# Patient Record
Sex: FEMALE | URBAN - METROPOLITAN AREA
[De-identification: names, ages, dates, MRNs, and addresses within clinical notes are randomized per-mention and may not be internally consistent; named-entity substitution may affect disease eponyms.]

---

## 2021-03-03 ENCOUNTER — NEW PATIENT (OUTPATIENT)
Dept: URBAN - METROPOLITAN AREA CLINIC 60 | Facility: CLINIC | Age: 71
End: 2021-03-03
Payer: COMMERCIAL

## 2021-03-03 DIAGNOSIS — H43.393 OTHER VITREOUS OPACITIES, BILATERAL: ICD-10-CM

## 2021-03-03 DIAGNOSIS — Z96.1 PRESENCE OF INTRAOCULAR LENS: ICD-10-CM

## 2021-03-03 DIAGNOSIS — Z79.84 LONG TERM (CURRENT) USE OF ORAL ANTIDIABETIC DRUGS: ICD-10-CM

## 2021-03-03 DIAGNOSIS — E11.9 TYPE 2 DIABETES MELLITUS WITHOUT COMPLICATIONS: Primary | ICD-10-CM

## 2021-03-03 PROCEDURE — 92004 COMPRE OPH EXAM NEW PT 1/>: CPT | Performed by: OPTOMETRIST

## 2021-03-03 RX ORDER — PREDNISOLONE ACETATE 10 MG/ML
1 % SUSPENSION/ DROPS OPHTHALMIC
Qty: 5 | Refills: 0 | Status: ACTIVE
Start: 2021-03-03

## 2021-03-03 RX ORDER — PROPYLENE GLYCOL 0.06 MG/ML
0.6 % SOLUTION/ DROPS OPHTHALMIC
Qty: 0 | Refills: 0 | Status: ACTIVE
Start: 2021-03-03

## 2021-03-03 ASSESSMENT — INTRAOCULAR PRESSURE
OD: 20
OS: 20

## 2021-03-03 ASSESSMENT — VISUAL ACUITY
OD: 20/25
OS: 20/30

## 2021-03-24 ENCOUNTER — FOLLOW UP ESTABLISHED (OUTPATIENT)
Dept: URBAN - METROPOLITAN AREA CLINIC 60 | Facility: CLINIC | Age: 71
End: 2021-03-24
Payer: COMMERCIAL

## 2021-03-24 DIAGNOSIS — H16.223 KERATOCONJUNCTIVITIS SICCA, BILATERAL: Primary | ICD-10-CM

## 2021-03-24 DIAGNOSIS — H18.593 OTHER HEREDITARY CORNEAL DYSTROPHIES: ICD-10-CM

## 2021-03-24 PROCEDURE — 92025 CPTRIZED CORNEAL TOPOGRAPHY: CPT | Performed by: OPTOMETRIST

## 2021-03-24 PROCEDURE — 92012 INTRM OPH EXAM EST PATIENT: CPT | Performed by: OPTOMETRIST

## 2021-03-24 RX ORDER — DOXYCYCLINE HYCLATE 100 MG/1
100 MG TABLET, COATED ORAL
Qty: 30 | Refills: 0 | Status: ACTIVE
Start: 2021-03-24

## 2021-03-24 ASSESSMENT — INTRAOCULAR PRESSURE
OS: 20
OD: 20

## 2022-07-07 ENCOUNTER — OFFICE VISIT (OUTPATIENT)
Dept: URBAN - METROPOLITAN AREA CLINIC 60 | Facility: CLINIC | Age: 72
End: 2022-07-07
Payer: COMMERCIAL

## 2022-07-07 DIAGNOSIS — H43.393 OTHER VITREOUS OPACITIES, BILATERAL: ICD-10-CM

## 2022-07-07 DIAGNOSIS — E11.9 TYPE 2 DIABETES MELLITUS W/O COMPLICATION: ICD-10-CM

## 2022-07-07 DIAGNOSIS — Z96.1 PRESENCE OF INTRAOCULAR LENS: ICD-10-CM

## 2022-07-07 DIAGNOSIS — H16.223 KERATOCONJUNCTIVITIS SICCA, BILATERAL: Primary | ICD-10-CM

## 2022-07-07 DIAGNOSIS — H18.593 OTHER HEREDITARY CORNEAL DYSTROPHIES: ICD-10-CM

## 2022-07-07 PROCEDURE — 92014 COMPRE OPH EXAM EST PT 1/>: CPT | Performed by: OPTOMETRIST

## 2022-07-07 PROCEDURE — 92025 CPTRIZED CORNEAL TOPOGRAPHY: CPT | Performed by: OPTOMETRIST

## 2022-07-07 RX ORDER — MINERAL OIL AND WHITE PETROLATUM 30; 940 MG/G; MG/G
OINTMENT OPHTHALMIC
Qty: 3.5 | Refills: 11 | Status: ACTIVE
Start: 2022-07-07

## 2022-07-07 RX ORDER — PROPYLENE GLYCOL 0.06 MG/ML
0.6 % SOLUTION/ DROPS OPHTHALMIC
Qty: 15 | Refills: 11 | Status: ACTIVE
Start: 2022-07-07

## 2022-07-07 ASSESSMENT — INTRAOCULAR PRESSURE
OS: 16
OD: 18

## 2022-07-07 NOTE — IMPRESSION/PLAN
Impression: Keratoconjunctivitis sicca, bilateral: K47.396. Plan: Patient has a history of dry eye. Diagnosis discussed with patient. Recommend patient use artificial tears 4 times a day and a gel or ointment QHS OU. Erx'd tears and gel to patient's pharmacy. If there is no improvement with tears and gel, patient to call office and will start her on a prescription dry eye drop.

## 2022-07-07 NOTE — IMPRESSION/PLAN
Impression: Other hereditary corneal dystrophies: H18.593. Plan: Patient educated on findings. Reji done today to monitor for progression. No need for surgical intervention at this time.

## 2022-07-07 NOTE — IMPRESSION/PLAN
Impression: Type 2 diabetes mellitus w/o complication: R51.4. Plan: No evidence of diabetic retinopathy or diabetic macular edema. Discussed ocular and systemic benefits of blood sugar control. Stressed importance of yearly diabetic eye exams.

## 2022-07-07 NOTE — IMPRESSION/PLAN
Impression: Other vitreous opacities, bilateral: H43.393. Plan: Patient educated regarding findings.

## 2023-12-21 ENCOUNTER — EMERGENCY (EMERGENCY)
Facility: HOSPITAL | Age: 73
LOS: 0 days | Discharge: HOME | End: 2023-12-22
Attending: EMERGENCY MEDICINE
Payer: MEDICARE

## 2023-12-21 VITALS
HEART RATE: 99 BPM | WEIGHT: 186.95 LBS | OXYGEN SATURATION: 98 % | HEIGHT: 64 IN | DIASTOLIC BLOOD PRESSURE: 72 MMHG | TEMPERATURE: 99 F | RESPIRATION RATE: 18 BRPM | SYSTOLIC BLOOD PRESSURE: 115 MMHG

## 2023-12-21 DIAGNOSIS — F43.20 ADJUSTMENT DISORDER, UNSPECIFIED: ICD-10-CM

## 2023-12-21 LAB
ALBUMIN SERPL ELPH-MCNC: 3.5 G/DL — SIGNIFICANT CHANGE UP (ref 3.3–5)
ALBUMIN SERPL ELPH-MCNC: 3.5 G/DL — SIGNIFICANT CHANGE UP (ref 3.3–5)
ALP SERPL-CCNC: 71 U/L — SIGNIFICANT CHANGE UP (ref 40–120)
ALP SERPL-CCNC: 71 U/L — SIGNIFICANT CHANGE UP (ref 40–120)
ALT FLD-CCNC: 16 U/L — SIGNIFICANT CHANGE UP (ref 12–78)
ALT FLD-CCNC: 16 U/L — SIGNIFICANT CHANGE UP (ref 12–78)
ANION GAP SERPL CALC-SCNC: 6 MMOL/L — SIGNIFICANT CHANGE UP (ref 5–17)
ANION GAP SERPL CALC-SCNC: 6 MMOL/L — SIGNIFICANT CHANGE UP (ref 5–17)
APAP SERPL-MCNC: <2 UG/ML — LOW (ref 10–30)
APAP SERPL-MCNC: <2 UG/ML — LOW (ref 10–30)
AST SERPL-CCNC: 14 U/L — LOW (ref 15–37)
AST SERPL-CCNC: 14 U/L — LOW (ref 15–37)
BASOPHILS # BLD AUTO: 0.1 K/UL — SIGNIFICANT CHANGE UP (ref 0–0.2)
BASOPHILS # BLD AUTO: 0.1 K/UL — SIGNIFICANT CHANGE UP (ref 0–0.2)
BASOPHILS NFR BLD AUTO: 1.1 % — SIGNIFICANT CHANGE UP (ref 0–2)
BASOPHILS NFR BLD AUTO: 1.1 % — SIGNIFICANT CHANGE UP (ref 0–2)
BILIRUB SERPL-MCNC: 0.4 MG/DL — SIGNIFICANT CHANGE UP (ref 0.2–1.2)
BILIRUB SERPL-MCNC: 0.4 MG/DL — SIGNIFICANT CHANGE UP (ref 0.2–1.2)
BUN SERPL-MCNC: 17 MG/DL — SIGNIFICANT CHANGE UP (ref 7–23)
BUN SERPL-MCNC: 17 MG/DL — SIGNIFICANT CHANGE UP (ref 7–23)
CALCIUM SERPL-MCNC: 9.3 MG/DL — SIGNIFICANT CHANGE UP (ref 8.5–10.1)
CALCIUM SERPL-MCNC: 9.3 MG/DL — SIGNIFICANT CHANGE UP (ref 8.5–10.1)
CHLORIDE SERPL-SCNC: 109 MMOL/L — HIGH (ref 96–108)
CHLORIDE SERPL-SCNC: 109 MMOL/L — HIGH (ref 96–108)
CO2 SERPL-SCNC: 24 MMOL/L — SIGNIFICANT CHANGE UP (ref 22–31)
CO2 SERPL-SCNC: 24 MMOL/L — SIGNIFICANT CHANGE UP (ref 22–31)
CREAT SERPL-MCNC: 0.83 MG/DL — SIGNIFICANT CHANGE UP (ref 0.5–1.3)
CREAT SERPL-MCNC: 0.83 MG/DL — SIGNIFICANT CHANGE UP (ref 0.5–1.3)
EGFR: 74 ML/MIN/1.73M2 — SIGNIFICANT CHANGE UP
EGFR: 74 ML/MIN/1.73M2 — SIGNIFICANT CHANGE UP
EOSINOPHIL # BLD AUTO: 0.07 K/UL — SIGNIFICANT CHANGE UP (ref 0–0.5)
EOSINOPHIL # BLD AUTO: 0.07 K/UL — SIGNIFICANT CHANGE UP (ref 0–0.5)
EOSINOPHIL NFR BLD AUTO: 0.8 % — SIGNIFICANT CHANGE UP (ref 0–6)
EOSINOPHIL NFR BLD AUTO: 0.8 % — SIGNIFICANT CHANGE UP (ref 0–6)
ETHANOL SERPL-MCNC: <10 MG/DL — SIGNIFICANT CHANGE UP (ref 0–10)
ETHANOL SERPL-MCNC: <10 MG/DL — SIGNIFICANT CHANGE UP (ref 0–10)
FLUAV AG NPH QL: SIGNIFICANT CHANGE UP
FLUAV AG NPH QL: SIGNIFICANT CHANGE UP
FLUBV AG NPH QL: SIGNIFICANT CHANGE UP
FLUBV AG NPH QL: SIGNIFICANT CHANGE UP
GLUCOSE SERPL-MCNC: 229 MG/DL — HIGH (ref 70–99)
GLUCOSE SERPL-MCNC: 229 MG/DL — HIGH (ref 70–99)
HCG SERPL-ACNC: 4 MIU/ML — SIGNIFICANT CHANGE UP
HCG SERPL-ACNC: 4 MIU/ML — SIGNIFICANT CHANGE UP
HCT VFR BLD CALC: 43.6 % — SIGNIFICANT CHANGE UP (ref 34.5–45)
HCT VFR BLD CALC: 43.6 % — SIGNIFICANT CHANGE UP (ref 34.5–45)
HGB BLD-MCNC: 14.5 G/DL — SIGNIFICANT CHANGE UP (ref 11.5–15.5)
HGB BLD-MCNC: 14.5 G/DL — SIGNIFICANT CHANGE UP (ref 11.5–15.5)
IMM GRANULOCYTES NFR BLD AUTO: 0.4 % — SIGNIFICANT CHANGE UP (ref 0–0.9)
IMM GRANULOCYTES NFR BLD AUTO: 0.4 % — SIGNIFICANT CHANGE UP (ref 0–0.9)
LYMPHOCYTES # BLD AUTO: 1.58 K/UL — SIGNIFICANT CHANGE UP (ref 1–3.3)
LYMPHOCYTES # BLD AUTO: 1.58 K/UL — SIGNIFICANT CHANGE UP (ref 1–3.3)
LYMPHOCYTES # BLD AUTO: 17.1 % — SIGNIFICANT CHANGE UP (ref 13–44)
LYMPHOCYTES # BLD AUTO: 17.1 % — SIGNIFICANT CHANGE UP (ref 13–44)
MCHC RBC-ENTMCNC: 28 PG — SIGNIFICANT CHANGE UP (ref 27–34)
MCHC RBC-ENTMCNC: 28 PG — SIGNIFICANT CHANGE UP (ref 27–34)
MCHC RBC-ENTMCNC: 33.3 GM/DL — SIGNIFICANT CHANGE UP (ref 32–36)
MCHC RBC-ENTMCNC: 33.3 GM/DL — SIGNIFICANT CHANGE UP (ref 32–36)
MCV RBC AUTO: 84.2 FL — SIGNIFICANT CHANGE UP (ref 80–100)
MCV RBC AUTO: 84.2 FL — SIGNIFICANT CHANGE UP (ref 80–100)
MONOCYTES # BLD AUTO: 0.47 K/UL — SIGNIFICANT CHANGE UP (ref 0–0.9)
MONOCYTES # BLD AUTO: 0.47 K/UL — SIGNIFICANT CHANGE UP (ref 0–0.9)
MONOCYTES NFR BLD AUTO: 5.1 % — SIGNIFICANT CHANGE UP (ref 2–14)
MONOCYTES NFR BLD AUTO: 5.1 % — SIGNIFICANT CHANGE UP (ref 2–14)
NEUTROPHILS # BLD AUTO: 6.97 K/UL — SIGNIFICANT CHANGE UP (ref 1.8–7.4)
NEUTROPHILS # BLD AUTO: 6.97 K/UL — SIGNIFICANT CHANGE UP (ref 1.8–7.4)
NEUTROPHILS NFR BLD AUTO: 75.5 % — SIGNIFICANT CHANGE UP (ref 43–77)
NEUTROPHILS NFR BLD AUTO: 75.5 % — SIGNIFICANT CHANGE UP (ref 43–77)
PLATELET # BLD AUTO: 250 K/UL — SIGNIFICANT CHANGE UP (ref 150–400)
PLATELET # BLD AUTO: 250 K/UL — SIGNIFICANT CHANGE UP (ref 150–400)
POTASSIUM SERPL-MCNC: 3.5 MMOL/L — SIGNIFICANT CHANGE UP (ref 3.5–5.3)
POTASSIUM SERPL-MCNC: 3.5 MMOL/L — SIGNIFICANT CHANGE UP (ref 3.5–5.3)
POTASSIUM SERPL-SCNC: 3.5 MMOL/L — SIGNIFICANT CHANGE UP (ref 3.5–5.3)
POTASSIUM SERPL-SCNC: 3.5 MMOL/L — SIGNIFICANT CHANGE UP (ref 3.5–5.3)
PROT SERPL-MCNC: 7.1 GM/DL — SIGNIFICANT CHANGE UP (ref 6–8.3)
PROT SERPL-MCNC: 7.1 GM/DL — SIGNIFICANT CHANGE UP (ref 6–8.3)
RBC # BLD: 5.18 M/UL — SIGNIFICANT CHANGE UP (ref 3.8–5.2)
RBC # BLD: 5.18 M/UL — SIGNIFICANT CHANGE UP (ref 3.8–5.2)
RBC # FLD: 13.5 % — SIGNIFICANT CHANGE UP (ref 10.3–14.5)
RBC # FLD: 13.5 % — SIGNIFICANT CHANGE UP (ref 10.3–14.5)
RSV RNA NPH QL NAA+NON-PROBE: SIGNIFICANT CHANGE UP
RSV RNA NPH QL NAA+NON-PROBE: SIGNIFICANT CHANGE UP
SALICYLATES SERPL-MCNC: 3.8 MG/DL — SIGNIFICANT CHANGE UP (ref 2.8–20)
SALICYLATES SERPL-MCNC: 3.8 MG/DL — SIGNIFICANT CHANGE UP (ref 2.8–20)
SARS-COV-2 RNA SPEC QL NAA+PROBE: SIGNIFICANT CHANGE UP
SARS-COV-2 RNA SPEC QL NAA+PROBE: SIGNIFICANT CHANGE UP
SODIUM SERPL-SCNC: 139 MMOL/L — SIGNIFICANT CHANGE UP (ref 135–145)
SODIUM SERPL-SCNC: 139 MMOL/L — SIGNIFICANT CHANGE UP (ref 135–145)
TSH SERPL-MCNC: 1.7 UU/ML — SIGNIFICANT CHANGE UP (ref 0.34–4.82)
TSH SERPL-MCNC: 1.7 UU/ML — SIGNIFICANT CHANGE UP (ref 0.34–4.82)
WBC # BLD: 9.23 K/UL — SIGNIFICANT CHANGE UP (ref 3.8–10.5)
WBC # BLD: 9.23 K/UL — SIGNIFICANT CHANGE UP (ref 3.8–10.5)
WBC # FLD AUTO: 9.23 K/UL — SIGNIFICANT CHANGE UP (ref 3.8–10.5)
WBC # FLD AUTO: 9.23 K/UL — SIGNIFICANT CHANGE UP (ref 3.8–10.5)

## 2023-12-21 PROCEDURE — 0241U: CPT

## 2023-12-21 PROCEDURE — 93010 ELECTROCARDIOGRAM REPORT: CPT

## 2023-12-21 PROCEDURE — 80053 COMPREHEN METABOLIC PANEL: CPT

## 2023-12-21 PROCEDURE — 99285 EMERGENCY DEPT VISIT HI MDM: CPT

## 2023-12-21 PROCEDURE — 80307 DRUG TEST PRSMV CHEM ANLYZR: CPT

## 2023-12-21 PROCEDURE — 84443 ASSAY THYROID STIM HORMONE: CPT

## 2023-12-21 PROCEDURE — 85025 COMPLETE CBC W/AUTO DIFF WBC: CPT

## 2023-12-21 PROCEDURE — 84702 CHORIONIC GONADOTROPIN TEST: CPT

## 2023-12-21 PROCEDURE — 36415 COLL VENOUS BLD VENIPUNCTURE: CPT

## 2023-12-21 PROCEDURE — 93005 ELECTROCARDIOGRAM TRACING: CPT

## 2023-12-21 PROCEDURE — 81001 URINALYSIS AUTO W/SCOPE: CPT

## 2023-12-21 RX ORDER — TRAZODONE HCL 50 MG
50 TABLET ORAL ONCE
Refills: 0 | Status: COMPLETED | OUTPATIENT
Start: 2023-12-21 | End: 2023-12-21

## 2023-12-21 NOTE — ED ADULT NURSE NOTE - NSFALLHARMRISKINTERV_ED_ALL_ED
Communicate risk of Fall with Harm to all staff, patient, and family/Provide visual cue: red socks, yellow wristband, yellow gown, etc/Reinforce activity limits and safety measures with patient and family/Bed in lowest position, wheels locked, appropriate side rails in place/Call bell, personal items and telephone in reach/Instruct patient to call for assistance before getting out of bed/chair/stretcher/Non-slip footwear applied when patient is off stretcher/[place] to call system/Physically safe environment - no spills, clutter or unnecessary equipment/Purposeful Proactive Rounding/Room/bathroom lighting operational, light cord in reach Communicate risk of Fall with Harm to all staff, patient, and family/Provide visual cue: red socks, yellow wristband, yellow gown, etc/Reinforce activity limits and safety measures with patient and family/Bed in lowest position, wheels locked, appropriate side rails in place/Call bell, personal items and telephone in reach/Instruct patient to call for assistance before getting out of bed/chair/stretcher/Non-slip footwear applied when patient is off stretcher/[place] to call system/Physically safe environment - no spills, clutter or unnecessary equipment/Purposeful Proactive Rounding/Room/bathroom lighting operational, light cord in reach

## 2023-12-21 NOTE — ED BEHAVIORAL HEALTH ASSESSMENT NOTE - NS ED BHA PLAN TR REFERRAL APPT DISCUSSED2 FT
PT IS RETURNING TO [place] ON 1/4/24 AND SHE IYER NOT WANT TO SEE PSYCHIATRIST. Her son form [place] has no safety concerns. PT IS RETURNING TO [place] ON 1/4/24 AND SHE IYER NOT WANT TO SEE PSYCHIATRIST. Her son form [place] has no safety concerns.

## 2023-12-21 NOTE — ED ADULT TRIAGE NOTE - CHIEF COMPLAINT QUOTE
Pt. A&Ox3, BIB [facility] with c/o suicidal thoughts. [facility] reports pt had thoughts of wanting to take pills this morning but where unable to obtain pills. Family took a knife out of her hand. She has not been taking her psychiatric medications. Pt reports being diagnosed with DID and being on an antidepressant. Has not taken her medication since yesterday. Today made a statement to her son and his friends that she would take a bunch of pills.   Escorted by [facility] #[id number]. 1:1 initiated Pt. A&Ox3, BIB [facility] with c/o suicidal thoughts. [facility] reports pt had thoughts of wanting to take pills this morning but where unable to obtain pills. Family took a knife out of her hand. She has not been taking her psychiatric medications. Pt reports being diagnosed with DID and being on an antidepressant. Has not taken her medication since yesterday. Today made a statement to her son and his friends that she would take a bunch of pills.   Escorted by [facility][id number]. 1:1 initiated Pt. A&Ox3, BIB [facility] with c/o suicidal thoughts. [facility] reports pt had thoughts of wanting to take pills this morning but where unable to obtain pills. Family took a knife out of her hand. She has not been taking her psychiatric medications. Pt reports being diagnosed with DID and being on an antidepressant. Has not taken her medication since yesterday. Today made a statement to her son and his friends that she would take a bunch of pills.   [id number] initiated  Denies SI/HI, auditory and visual hallucinations. Pt. A&Ox3, BIB [facility] with c/o suicidal thoughts. [facility] reports pt had thoughts of wanting to take pills this morning but where unable to obtain pills. Family took a knife out of her hand. She has not been taking her psychiatric medications. Pt reports being diagnosed with DID and being on an antidepressant. Has not taken her medication since yesterday. Today made a statement to her son and his friends that she would take a bunch of pills.   [id number] initiated  Denies SI/HI, auditory and visual hallucinations.

## 2023-12-21 NOTE — ED BEHAVIORAL HEALTH ASSESSMENT NOTE - SUMMARY
72 yo female, currently domiciled at home alone in [facility] (currently visiting Son), part-time work as  at a school with pphx of dissociative identity disorder, depression and pmh of HTN, HLD that presented due to concern SI. To note patient has 1x hospitalization (last in 1980s), no previous SA, no previous self harm, no legal/violence hx, no substance use hx.           Patient presenting after reported SI per police and patient reports reporting SI during argument with son. Patient denies actual SI and reports passive SI. Patient is hyper focused on her family conflict and unable to complete safety plan and come up with next steps. Additionally unable to reach collateral to make safe discharge plan. At this time will hold for additional collateral and safety planning in the morning.           Plan:      -Hold for collateral/safety planning     -Continue Home Medication which includes: Effexor 150mg     -PRNs: Haldol 2/Ativan 1/Benadryl 2mg q6hr prn severe agitation;  monitor QTc, monitor for sedation, attempt verbal 74 yo female, currently domiciled at home alone in [facility] (currently visiting Son), part-time work as  at a school with pphx of dissociative identity disorder, depression and pmh of HTN, HLD that presented due to concern SI. To note patient has 1x hospitalization (last in 1980s), no previous SA, no previous self harm, no legal/violence hx, no substance use hx.           Patient presenting after reported SI per police and patient reports reporting SI during argument with son. Patient denies actual SI and reports passive SI. Patient is hyper focused on her family conflict and unable to complete safety plan and come up with next steps. Additionally unable to reach collateral to make safe discharge plan. At this time will hold for additional collateral and safety planning in the morning.           Plan:      -Hold for collateral/safety planning     -Continue Home Medication which includes: Effexor 150mg     -PRNs: Haldol 2/Ativan 1/Benadryl 2mg q6hr prn severe agitation;  monitor QTc, monitor for sedation, attempt verbal

## 2023-12-21 NOTE — ED BEHAVIORAL HEALTH ASSESSMENT NOTE - DESCRIPTION
see BH note , 4 grown children (2 in [place], [place], 1 [facility]); currently working as a  at a school , 4 grown children (2 in [place], [place], 1 [facility]); currently working as a  at a school HTN, HLD

## 2023-12-21 NOTE — ED PROVIDER NOTE - OBJECTIVE STATEMENT
Pt is a 73y female w/ a PMH of dissociative identity disorder (non-compliant w/ medications) presents to the ED [facility]IB [facility] for an evaluation of SI. Pt states, "it was a bad night. I was arguing with my son." Per [facility], reports the patient had thoughts of wanting to take pills this morning and told her son/friends, was unable to obtain pills. Family reports taking a knife out of her hand. Endorses depression. Denies SI/HI, auditory/visual hallucinations. NKA.

## 2023-12-21 NOTE — ED PROVIDER NOTE - PATIENT PORTAL LINK FT
You can access the FollowMyHealth Patient Portal offered by [facility] by registering at the following website: [email or web address]. By joining [facility]’s FollowMyHealth portal, you will also be able to view your health information using other applications (apps) compatible with our system. You can access the FollowMyHealth Patient Portal offered by [facility] by registering at the following website: [email or web address]. By joining [facility]’s FollowMyHealth portal, you will also be able to view your health information using other applications (apps) compatible with our system.

## 2023-12-21 NOTE — ED BEHAVIORAL HEALTH NOTE - BEHAVIORAL HEALTH NOTE
ED COURSE      ============      SOURCE: Sarah PAINTING     ARRIVAL: [facility]     BELONGINGS:      None notable, stowed away.       BEHAVIOR: Per RN, patient calm, cooperative, and under behavioral control. The patient’s hygiene is good and there are no visible cuts or bruising. Per RN, Patient has not endorsed SI/Hi in the end.      Per RN, Patient is orientedx4, makes good eye contact, linear thoughts, and normal speech. Patient is resting.     TREATMENT: Patient has not received any medication or treatments.     VISITORS: Patient is unaccompanied at this time.

## 2023-12-21 NOTE — ED BEHAVIORAL HEALTH ASSESSMENT NOTE - NS ED BHA PLAN TR SAFTETY PLAN DISCUSSED2 FT
pt goes to bed and tries to relax and sleep if in crisis, she talks to son, she has friends in [place] who are supportive. She knows to [facility] in case of any emergency. pt goes to bed and tries to relax and sleep if in crisis, she talks to son, she has friends in [place] who are supportive. She knows to [facility] in case of any emergency.

## 2023-12-21 NOTE — ED BEHAVIORAL HEALTH ASSESSMENT NOTE - CURRENT MEDICATION
reports unclear on exact mg or medication but reports follwing  Effexor XR 150mg (was taking 300), Xarelto, Levostatin, Lisinopril/HCTZ

## 2023-12-21 NOTE — ED BEHAVIORAL HEALTH ASSESSMENT NOTE - RISK ASSESSMENT
RF: not connected to care, family conflict, passive SI, unable to complete safety plan  PF: family, housing, no reported guns, denies SA/Self harm

## 2023-12-21 NOTE — ED ADULT NURSE NOTE - NSFALLRISKFACTORS_ED_ALL_ED
on xarelto/Coagulation: Bleeding disorder either through use of anticoagulants or underlying clinical condition(s)

## 2023-12-21 NOTE — ED PROVIDER NOTE - CLINICAL SUMMARY MEDICAL DECISION MAKING FREE TEXT BOX
73y female presents w/ a story that doesn't align w/ her family's story. Concern for safety as she allegedly had a knife. Will plan for psych consult.

## 2023-12-21 NOTE — ED BEHAVIORAL HEALTH ASSESSMENT NOTE - NSACTIVEVENT_PSY_ALL_CORE
Triggering events leading to humiliation, shame, and/or despair (e.g., Loss of relationship, financial or health status) (real or anticipated) Family conflict/Triggering events leading to humiliation, shame, and/or despair (e.g., Loss of relationship, financial or health status) (real or anticipated)

## 2023-12-21 NOTE — ED BEHAVIORAL HEALTH ASSESSMENT NOTE - NS ED BHA TELEPSYCH PROVIDER LOCATION
[address] [facility], [place], [place] [address] [facility], [place]

## 2023-12-21 NOTE — ED PROVIDER NOTE - PROGRESS NOTE DETAILS
pt is medically cleared, called telepsych to present ALLA Nowak DO spoke to psychiatrist, to give trazodone now and needs collateral information, er hold until morning for collateral. so to pm sushant Nowak DO pt evaluated and cleared by [facility].   evaluated by FLROENCIO Pedro and states spoke with son who will pick her up and will p/u script for her uti. pt evaluated and cleared by [facility].   evaluated by FLORENCIO Pedro and states spoke with son who will pick her up and will p/u script for her uti.

## 2023-12-21 NOTE — ED BEHAVIORAL HEALTH ASSESSMENT NOTE - NSBHATTESTBILLING_PSY_A_CORE
[id number]-Tivzwsrepej diagnostic evaluation with medical services [id number]-Xpleeshbsfw diagnostic evaluation with medical services

## 2023-12-21 NOTE — ED BEHAVIORAL HEALTH ASSESSMENT NOTE - HPI (INCLUDE ILLNESS QUALITY, SEVERITY, DURATION, TIMING, CONTEXT, MODIFYING FACTORS, ASSOCIATED SIGNS AND SYMPTOMS)
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 To note patient has 1x hospitalization (last in 1980s), no previous SA, no previous self harm, no legal/violence hx, no substance use hx.      On interview, she states she arrived to be with her son on Tuesday and then the next day problems started in which he kept having his friends over. Additionally reports started arguing most of the time. Reports today he was arguing with her son about things and things reportedly got “nasty.” During the argument she states she said “Don’t worry I got pills” when he told her to leave the house. She states she shouldn’t have done this but was just angry at the time. Denies active SI during that time. Reports she is unsure where there was a concern about her having a knife as she states she never had one and would never hurt herself. Reports feeling depressed. Reports chronic passive SI but denies active SI. Reports okay energy and appetite. Reports poor sleep recently. Denies HI/AVH. Attempted for extended period to complete safety plan with patient and patient unable to complete any part and was focused on family conflict. Denies drug use. Reports no recent DID experience. Reports not seeing psychiatrist or therapist since start of pandemic and only taking Effexor 150mg (not 300mg prescribed) as financial issues. Verbally consents for collateral from family 74 yo female, [place] dom[place] [facility] [place] Son), part-time work as  at a school with pphx of dissociative identity disorder, depression and pmh of HTN, HLD that presented due to concern SI. To note patient has 1x hospitalization (last in 1980s), no previous SA, no previous self harm, no legal/violence hx, no substance use hx.      On interview, she states she arrived to be with her son on Tuesday and then the next day problems started in which he kept having his friends over. Additionally reports started arguing most of the time. Reports today he was arguing with her son about things and things reportedly got “nasty.” During the argument she states she said “Don’t worry I got pills” when he told her to leave the house. She states she shouldn’t have done this but was just angry at the time. Denies active SI during that time. Reports she is unsure where there was a concern about her having a knife as she states she never had one and would never hurt herself. Reports feeling depressed. Reports chronic passive SI but denies active SI. Reports okay energy and appetite. Reports poor sleep recently. Denies HI/AVH. Attempted for extended period to complete safety plan with patient and patient unable to complete any part and was focused on family conflict. Denies drug use. Reports no recent DID experience. Reports not seeing psychiatrist or therapist since start of pandemic and only taking Effexor 150mg (not 300mg prescribed) as financial issues. Verbally consents for collateral from family

## 2023-12-21 NOTE — ED ADULT NURSE NOTE - OBJECTIVE STATEMENT
patient [facility] for suicidal thoughts. pt reports she is feeling depressed but is not suicidal. denies homicidal ideation. as per EMS, she has not been taking her psychiatric medications and they report she made a statement to her son and his friends that she would take a bunch of pills. pt on 1:1 in [facility]. blood work and EKG obtained, pending urine collection at this time. pt is non-smoker, non-drinker. reports she lives alone, but does not necessarily feel "safe". feels recently depressed due to multiple reasons which she does not chose to discuss to primary RN. patient [facility] for suicidal thoughts. pt reports she is feeling depressed but is not suicidal. denies homicidal ideation. as per EMS, she has not been taking her psychiatric medications and they report she made a statement to her son and his friends that she would take a bunch of pills. pt on 1:1 in [facility]. blood work and EKG obtained, pending urine collection at this time. pt is non-smoker, non-drinker. reports she lives alone, but does not necessarily feel "safe". feels recently depressed due to multiple reasons which she does not chose to discuss to primary RN.

## 2023-12-21 NOTE — ED ADULT NURSE NOTE - CHIEF COMPLAINT QUOTE
Pt. A&Ox3, BIB [facility] with c/o suicidal thoughts. [facility] reports pt had thoughts of wanting to take pills this morning but where unable to obtain pills. Family took a knife out of her hand. She has not been taking her psychiatric medications. Pt reports being diagnosed with DID and being on an antidepressant. Has not taken her medication since yesterday. Today made a statement to her son and his friends that she would take a bunch of pills.   [id number]1 initiated  Denies SI/HI, auditory and visual hallucinations. Pt. A&Ox3, BIB [facility] with c/o suicidal thoughts. [facility] reports pt had thoughts of wanting to take pills this morning but where unable to obtain pills. Family took a knife out of her hand. She has not been taking her psychiatric medications. Pt reports being diagnosed with DID and being on an antidepressant. Has not taken her medication since yesterday. Today made a statement to her son and his friends that she would take a bunch of pills.   [id number] 1[facility]1 initiated  Denies SI/HI, auditory and visual hallucinations.

## 2023-12-22 VITALS
TEMPERATURE: 98 F | RESPIRATION RATE: 18 BRPM | DIASTOLIC BLOOD PRESSURE: 61 MMHG | OXYGEN SATURATION: 95 % | HEART RATE: 74 BPM | SYSTOLIC BLOOD PRESSURE: 120 MMHG

## 2023-12-22 LAB
AMPHET UR-MCNC: NEGATIVE — SIGNIFICANT CHANGE UP
AMPHET UR-MCNC: NEGATIVE — SIGNIFICANT CHANGE UP
APPEARANCE UR: ABNORMAL
APPEARANCE UR: ABNORMAL
BACTERIA # UR AUTO: ABNORMAL /HPF
BACTERIA # UR AUTO: ABNORMAL /HPF
BARBITURATES UR SCN-MCNC: NEGATIVE — SIGNIFICANT CHANGE UP
BARBITURATES UR SCN-MCNC: NEGATIVE — SIGNIFICANT CHANGE UP
BENZODIAZ UR-MCNC: POSITIVE — SIGNIFICANT CHANGE UP
BENZODIAZ UR-MCNC: POSITIVE — SIGNIFICANT CHANGE UP
BILIRUB UR-MCNC: NEGATIVE — SIGNIFICANT CHANGE UP
BILIRUB UR-MCNC: NEGATIVE — SIGNIFICANT CHANGE UP
CAST: 3 /LPF — SIGNIFICANT CHANGE UP (ref 0–4)
CAST: 3 /LPF — SIGNIFICANT CHANGE UP (ref 0–4)
COCAINE METAB.OTHER UR-MCNC: NEGATIVE — SIGNIFICANT CHANGE UP
COCAINE METAB.OTHER UR-MCNC: NEGATIVE — SIGNIFICANT CHANGE UP
COLOR SPEC: YELLOW — SIGNIFICANT CHANGE UP
COLOR SPEC: YELLOW — SIGNIFICANT CHANGE UP
DIFF PNL FLD: ABNORMAL
DIFF PNL FLD: ABNORMAL
GLUCOSE UR QL: NEGATIVE MG/DL — SIGNIFICANT CHANGE UP
GLUCOSE UR QL: NEGATIVE MG/DL — SIGNIFICANT CHANGE UP
KETONES UR-MCNC: NEGATIVE MG/DL — SIGNIFICANT CHANGE UP
KETONES UR-MCNC: NEGATIVE MG/DL — SIGNIFICANT CHANGE UP
LEUKOCYTE ESTERASE UR-ACNC: ABNORMAL
LEUKOCYTE ESTERASE UR-ACNC: ABNORMAL
METHADONE UR-MCNC: NEGATIVE — SIGNIFICANT CHANGE UP
METHADONE UR-MCNC: NEGATIVE — SIGNIFICANT CHANGE UP
NITRITE UR-MCNC: POSITIVE
NITRITE UR-MCNC: POSITIVE
OPIATES UR-MCNC: NEGATIVE — SIGNIFICANT CHANGE UP
OPIATES UR-MCNC: NEGATIVE — SIGNIFICANT CHANGE UP
PCP SPEC-MCNC: SIGNIFICANT CHANGE UP
PCP SPEC-MCNC: SIGNIFICANT CHANGE UP
PCP UR-MCNC: NEGATIVE — SIGNIFICANT CHANGE UP
PCP UR-MCNC: NEGATIVE — SIGNIFICANT CHANGE UP
PH UR: 5.5 — SIGNIFICANT CHANGE UP (ref 5–8)
PH UR: 5.5 — SIGNIFICANT CHANGE UP (ref 5–8)
PROT UR-MCNC: NEGATIVE MG/DL — SIGNIFICANT CHANGE UP
PROT UR-MCNC: NEGATIVE MG/DL — SIGNIFICANT CHANGE UP
RBC CASTS # UR COMP ASSIST: 1 /HPF — SIGNIFICANT CHANGE UP (ref 0–4)
RBC CASTS # UR COMP ASSIST: 1 /HPF — SIGNIFICANT CHANGE UP (ref 0–4)
SP GR SPEC: 1.01 — SIGNIFICANT CHANGE UP (ref 1–1.03)
SP GR SPEC: 1.01 — SIGNIFICANT CHANGE UP (ref 1–1.03)
SQUAMOUS # UR AUTO: 15 /HPF — HIGH (ref 0–5)
SQUAMOUS # UR AUTO: 15 /HPF — HIGH (ref 0–5)
THC UR QL: NEGATIVE — SIGNIFICANT CHANGE UP
THC UR QL: NEGATIVE — SIGNIFICANT CHANGE UP
UROBILINOGEN FLD QL: 0.2 MG/DL — SIGNIFICANT CHANGE UP (ref 0.2–1)
UROBILINOGEN FLD QL: 0.2 MG/DL — SIGNIFICANT CHANGE UP (ref 0.2–1)
WBC UR QL: 108 /HPF — HIGH (ref 0–5)
WBC UR QL: 108 /HPF — HIGH (ref 0–5)

## 2023-12-22 RX ADMIN — Medication 50 MILLIGRAM(S): at 00:41

## 2023-12-22 RX ADMIN — Medication 1 TABLET(S): at 13:54

## 2023-12-22 NOTE — CHART NOTE - NSCHARTNOTEFT_GEN_A_CORE
Pt is a 73 yr old female with a PMHx of dissociative identity disorder (non-compliant w/ medications). Pt is cleared medically and psychiatrically at this time. Pt diagnosed with UTI and will go home on PO ABX. Pt's son, Saman [phone], provided [facility] [address] in [place] as local pharmacy. Pt wishes to return to Saman's house. Saman is in agreement, will  pt @ 2pm and get get medication at pharmacy on [address] to his home at [address] [place] [address]. Case discussed with MD and RN, who are in agreement with safe discharge. Pt is a 73 yr old female with a PMHx of dissociative identity disorder (non-compliant w/ medications). Pt is cleared medically and psychiatrically at this time. Pt diagnosed with UTI and will go home on PO ABX. Pt's son, Saman [phone], provided [facility] [address] in [place] as local pharmacy. Pt wishes to return to Saman's house. Saman is in agreement, will  pt @ 2pm and get get medication at pharmacy on way to his home at [address] [place] [address]. Case discussed with MD and RN, who are in agreement with safe discharge.
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
## 2023-12-23 DIAGNOSIS — F43.20 ADJUSTMENT DISORDER, UNSPECIFIED: ICD-10-CM

## 2023-12-23 DIAGNOSIS — N39.0 URINARY TRACT INFECTION, SITE NOT SPECIFIED: ICD-10-CM

## 2023-12-23 DIAGNOSIS — E78.5 HYPERLIPIDEMIA, UNSPECIFIED: ICD-10-CM

## 2023-12-23 DIAGNOSIS — Z91.148 PATIENT'S OTHER NONCOMPLIANCE WITH MEDICATION REGIMEN FOR OTHER REASON: ICD-10-CM

## 2023-12-23 DIAGNOSIS — Z20.822 CONTACT WITH AND (SUSPECTED) EXPOSURE TO COVID-19: ICD-10-CM

## 2023-12-23 DIAGNOSIS — Z13.30 ENCOUNTER FOR SCREENING EXAMINATION FOR MENTAL HEALTH AND BEHAVIORAL DISORDERS, UNSPECIFIED: ICD-10-CM

## 2023-12-23 DIAGNOSIS — F32.A DEPRESSION, UNSPECIFIED: ICD-10-CM

## 2023-12-23 DIAGNOSIS — F44.81 DISSOCIATIVE IDENTITY DISORDER: ICD-10-CM

## 2023-12-23 DIAGNOSIS — I10 ESSENTIAL (PRIMARY) HYPERTENSION: ICD-10-CM

## 2024-07-15 NOTE — ED BEHAVIORAL HEALTH ASSESSMENT NOTE - OTHER PAST PSYCHIATRIC HISTORY (INCLUDE DETAILS REGARDING ONSET, COURSE OF ILLNESS, INPATIENT/OUTPATIENT TREATMENT)
Detail Level: Simple Laser Recommendations: KTP laser quoted 440$ Previous Dx: dissociative identity disorder, depression     Previous Med Trials: unclear     Previous IP treatment: 1x early 80s     Previous SA: denies     Self harming: denies     Current MH treatment: no current since pandemic      Violence/Legal: denies